# Patient Record
Sex: FEMALE | Race: BLACK OR AFRICAN AMERICAN | Employment: PART TIME | URBAN - METROPOLITAN AREA
[De-identification: names, ages, dates, MRNs, and addresses within clinical notes are randomized per-mention and may not be internally consistent; named-entity substitution may affect disease eponyms.]

---

## 2018-02-16 ENCOUNTER — HOSPITAL ENCOUNTER (OUTPATIENT)
Dept: BONE DENSITY | Age: 73
Discharge: HOME | End: 2018-02-16
Attending: FAMILY MEDICINE
Payer: MEDICARE

## 2018-02-16 ENCOUNTER — HOSPITAL ENCOUNTER (OUTPATIENT)
Dept: MAMMOGRAPHY | Age: 73
Discharge: HOME | End: 2018-02-16
Attending: FAMILY MEDICINE
Payer: MEDICARE

## 2018-02-16 DIAGNOSIS — M81.0 OSTEOPOROSIS: ICD-10-CM

## 2018-02-16 DIAGNOSIS — Z12.31 VISIT FOR SCREENING MAMMOGRAM: ICD-10-CM

## 2018-02-16 PROCEDURE — 77067 SCR MAMMO BI INCL CAD: CPT

## 2018-02-28 ENCOUNTER — HOSPITAL ENCOUNTER (OUTPATIENT)
Dept: BONE DENSITY | Age: 73
Discharge: HOME | End: 2018-02-28
Attending: FAMILY MEDICINE
Payer: MEDICARE

## 2018-02-28 PROCEDURE — 77080 DXA BONE DENSITY AXIAL: CPT

## 2019-02-13 ENCOUNTER — HOSPITAL ENCOUNTER (OUTPATIENT)
Dept: MAMMOGRAPHY | Age: 74
Discharge: HOME | End: 2019-02-13
Attending: FAMILY MEDICINE
Payer: MEDICARE

## 2019-02-13 DIAGNOSIS — Z12.31 VISIT FOR SCREENING MAMMOGRAM: ICD-10-CM

## 2019-02-13 PROCEDURE — 77067 SCR MAMMO BI INCL CAD: CPT

## 2020-03-03 ENCOUNTER — HOSPITAL ENCOUNTER (OUTPATIENT)
Dept: BONE DENSITY | Age: 75
Discharge: HOME | End: 2020-03-03
Attending: FAMILY MEDICINE
Payer: MEDICARE

## 2020-03-03 ENCOUNTER — HOSPITAL ENCOUNTER (OUTPATIENT)
Dept: MAMMOGRAPHY | Age: 75
Discharge: HOME | End: 2020-03-03
Attending: FAMILY MEDICINE
Payer: MEDICARE

## 2020-03-03 DIAGNOSIS — M81.0 OSTEOPOROSIS: ICD-10-CM

## 2020-03-03 DIAGNOSIS — Z12.31 SCREENING MAMMOGRAM FOR HIGH-RISK PATIENT: ICD-10-CM

## 2020-03-03 PROCEDURE — 77067 SCR MAMMO BI INCL CAD: CPT

## 2020-03-03 PROCEDURE — 77080 DXA BONE DENSITY AXIAL: CPT

## 2020-11-18 ENCOUNTER — TRANSCRIBE ORDER (OUTPATIENT)
Dept: SCHEDULING | Age: 75
End: 2020-11-18

## 2020-11-18 DIAGNOSIS — R13.10 DYSPHAGIA: Primary | ICD-10-CM

## 2020-12-04 ENCOUNTER — HOSPITAL ENCOUNTER (OUTPATIENT)
Dept: GENERAL RADIOLOGY | Age: 75
Discharge: HOME | End: 2020-12-04
Payer: MEDICARE

## 2020-12-04 DIAGNOSIS — R13.10 DYSPHAGIA: ICD-10-CM

## 2020-12-04 PROCEDURE — 92611 MOTION FLUOROSCOPY/SWALLOW: CPT

## 2020-12-04 PROCEDURE — 74230 X-RAY XM SWLNG FUNCJ C+: CPT

## 2020-12-04 NOTE — PROGRESS NOTES
[address]  [address] [facility], [place] MODIFIED BARIUM SWALLOW STUDY  Patient: Effie Bynum (14 y.o. female)  Date: 12/4/2020  Referring Provider:  Dr. Aminata Archer:   Patient reported globus sensation while pointing to her upper chest with food. This is worst with peanut butter which she no longer eats for this reason. Patient also reported choking with thin liquids while drinking flat in bed. Patient with history of esophageal dilation ~10 years ago. Patient reported she used to take medication for reflux, however per patient report she no longer takes it as it is not on the market anymore. OBJECTIVE:   Past Medical History:   Past Medical History:   Diagnosis Date    Arthritis     GERD (gastroesophageal reflux disease)     Hypertension     PUD (peptic ulcer disease)     Thyroid disease      Past Surgical History:   Procedure Laterality Date    HX OTHER SURGICAL      lens implant bilat.  HX TONSILLECTOMY      ND COLONOSCOPY FLX DX W/COLLJ SPEC WHEN PFRMD  1/11/2013          Current Dietary Status:  Regular/thin  Radiologist: Dr. Taylor Reevesmine: Lateral;Fluoro  Patient Position: standing upright    Trial 1:   Consistency Presented: Thin liquid;Puree; Solid   How Presented: Self-fed/presented;Cup/sip;Cup/gulp; Spoon;Straw;Successive swallows       Bolus Acceptance: No impairment   Bolus Formation/Control: No impairment:     Propulsion: No impairment   Oral Residue: None   Initiation of Swallow: No impairment   Timing: No impairment   Penetration: None   Aspiration/Timing: No evidence of aspiration   Pharyngeal Clearance: No residue                       Decreased Tongue Base Retraction?: No  Laryngeal Elevation: WFL (within functional limits)  Aspiration/Penetration Score: 1 (No penetration or aspiration-Contrast does not enter the airway)  Pharyngeal Symmetry: Not assessed  Pharyngeal-Esophageal Segment: Suspected esophageal dysphagia  Pharyngeal Dysfunction: None    Oral Phase Severity: No impairment  Pharyngeal Phase Severity: N/A     The NOMS functional outcome measure was used to quantify this patient's level of swallowing impairment. Based on the NOMS, the patient was determined to be at level 7 for swallow function         NOMS Swallowing Levels:  Level 1 (CN): NPO  Level 2 (CM): NPO but takes consistency in therapy  Level 3 (CL): Takes less than 50% of nutrition p.o. and continues with nonoral feedings; and/or safe with mod cues; and/or max diet restriction  Level 4 (CK): Safe swallow but needs mod cues; and/or mod diet restriction; and/or still requires some nonoral feeding/supplements  Level 5 (CJ): Safe swallow with min diet restriction; and/or needs min cues  Level 6 (CI): Independent with p.o.; rare cues; usually self cues; may need to avoid some foods or needs extra time  Level 7 ([address] [place] [address]): Independent for all p.o.  ARCELIA. (2003). National Outcomes Measurement System (NOMS): Adult Speech-Language Pathology User's Guide. ASSESSMENT :  Based on the objective data described above, the patient presents with no oral or pharyngeal dysphagia, and no penetration/aspiration or significant pharyngeal residue observed. Patient with reduced PES opening that did not obstruct bolus flow. Patient also with air in the esophagus at rest as well as cervical esophageal residue with puree and solid which suggests esophageal dysphagia. Of note, patient with throat clearing/coughing throughout in absence of penetration/aspiration.      PLAN/RECOMMENDATIONS :  -Continue diet as tolerated, encouraged soft solids  -Upright for all PO intake (and no drinking in bed), small/single bites, chew thoroughly, alternate bite/sip  -No further SLP treatment indicated  -Consider GI consult given suspected esophageal dysphagia     COMMUNICATION/EDUCATION:   The above findings and recommendations were discussed with: patient who verbalized understanding.     Thank you for this referral.  Shae Garcia, SLP  Time Calculation: 20 mins

## 2021-03-12 ENCOUNTER — TRANSCRIBE ORDER (OUTPATIENT)
Dept: SCHEDULING | Age: 76
End: 2021-03-12

## 2021-03-12 DIAGNOSIS — Z12.31 OTHER SCREENING MAMMOGRAM: Primary | ICD-10-CM

## 2021-03-23 ENCOUNTER — TRANSCRIBE ORDER (OUTPATIENT)
Dept: SCHEDULING | Age: 76
End: 2021-03-23

## 2021-03-23 DIAGNOSIS — M81.0 POSTMENOPAUSAL OSTEOPOROSIS: Primary | ICD-10-CM

## 2021-04-05 ENCOUNTER — HOSPITAL ENCOUNTER (OUTPATIENT)
Dept: MAMMOGRAPHY | Age: 76
Discharge: HOME | End: 2021-04-05
Payer: MEDICARE

## 2021-04-05 DIAGNOSIS — Z12.31 OTHER SCREENING MAMMOGRAM: ICD-10-CM

## 2021-04-05 PROCEDURE — 77067 SCR MAMMO BI INCL CAD: CPT

## 2022-03-21 ENCOUNTER — TRANSCRIBE ORDER (OUTPATIENT)
Dept: SCHEDULING | Age: 77
End: 2022-03-21

## 2022-03-21 DIAGNOSIS — Z12.31 VISIT FOR SCREENING MAMMOGRAM: Primary | ICD-10-CM

## 2022-04-01 ENCOUNTER — TRANSCRIBE ORDER (OUTPATIENT)
Dept: SCHEDULING | Age: 77
End: 2022-04-01

## 2022-04-01 DIAGNOSIS — M81.0 MENOPAUSAL OSTEOPOROSIS: Primary | ICD-10-CM

## 2022-04-01 DIAGNOSIS — Z12.31 VISIT FOR SCREENING MAMMOGRAM: Primary | ICD-10-CM

## 2022-04-06 ENCOUNTER — HOSPITAL ENCOUNTER (OUTPATIENT)
Dept: MAMMOGRAPHY | Age: 77
Discharge: HOME | End: 2022-04-06
Attending: FAMILY MEDICINE
Payer: MEDICARE

## 2022-04-06 DIAGNOSIS — Z12.31 VISIT FOR SCREENING MAMMOGRAM: ICD-10-CM

## 2022-04-06 PROCEDURE — 77067 SCR MAMMO BI INCL CAD: CPT

## 2022-04-07 ENCOUNTER — HOSPITAL ENCOUNTER (OUTPATIENT)
Dept: BONE DENSITY | Age: 77
Discharge: HOME | End: 2022-04-07
Attending: FAMILY MEDICINE
Payer: MEDICARE

## 2022-04-07 DIAGNOSIS — M81.0 MENOPAUSAL OSTEOPOROSIS: ICD-10-CM

## 2022-04-07 PROCEDURE — 77080 DXA BONE DENSITY AXIAL: CPT

## 2022-09-14 RX ORDER — HEPARIN 100 UNIT/ML
500 SYRINGE INTRAVENOUS AS NEEDED
Start: 2022-09-23

## 2022-09-14 RX ORDER — SODIUM CHLORIDE 0.9 % (FLUSH) 0.9 %
5-40 SYRINGE (ML) INJECTION AS NEEDED
OUTPATIENT
Start: 2022-09-23

## 2022-09-14 RX ORDER — SODIUM CHLORIDE 9 MG/ML
5-250 INJECTION, SOLUTION INTRAVENOUS AS NEEDED
OUTPATIENT
Start: 2022-09-23

## 2022-09-14 RX ORDER — SODIUM CHLORIDE 9 MG/ML
5-40 INJECTION INTRAMUSCULAR; INTRAVENOUS; SUBCUTANEOUS AS NEEDED
OUTPATIENT
Start: 2022-09-23

## 2022-09-20 NOTE — PROGRESS NOTES
Cherelle Eid is a 68 y.o. female here for new patient appt for thrombocytopenia. Labs done 7/22/22  Plt 47  Pt was seeing a hematologist about 2 years ago but he moved to [place]. She was taking 8 fish oil tablets per day for him but was not helping. She has pigmentation of the skin. She does bruise easily. No unusual bleeding. She has changed her diet and has lost some weight recently. 1. Have you been to the ER, urgent care clinic since your last visit? Hospitalized since your last visit? New Pt    2. Have you seen or consulted any other health care providers outside of the [address] [place] [address] since your last visit? Include any pap smears or colon screening.  New Pt

## 2022-09-23 ENCOUNTER — OFFICE VISIT (OUTPATIENT)
Dept: ONCOLOGY | Age: 77
End: 2022-09-23
Payer: MEDICARE

## 2022-09-23 ENCOUNTER — HOSPITAL ENCOUNTER (OUTPATIENT)
Dept: INFUSION THERAPY | Age: 77
Discharge: HOME | End: 2022-09-23
Payer: MEDICARE

## 2022-09-23 VITALS
HEART RATE: 72 BPM | RESPIRATION RATE: 16 BRPM | TEMPERATURE: 97.1 F | SYSTOLIC BLOOD PRESSURE: 134 MMHG | DIASTOLIC BLOOD PRESSURE: 76 MMHG | OXYGEN SATURATION: 95 %

## 2022-09-23 VITALS
HEART RATE: 72 BPM | RESPIRATION RATE: 16 BRPM | HEIGHT: 62 IN | DIASTOLIC BLOOD PRESSURE: 76 MMHG | BODY MASS INDEX: 32.35 KG/M2 | OXYGEN SATURATION: 95 % | TEMPERATURE: 97.1 F | WEIGHT: 175.8 LBS | SYSTOLIC BLOOD PRESSURE: 134 MMHG

## 2022-09-23 DIAGNOSIS — D69.3 CHRONIC ITP (IDIOPATHIC THROMBOCYTOPENIA) (HCC): Primary | ICD-10-CM

## 2022-09-23 LAB
BASOPHILS # BLD: 0 K/UL (ref 0–0.1)
BASOPHILS NFR BLD: 1 % (ref 0–1)
DIFFERENTIAL METHOD BLD: ABNORMAL
EOSINOPHIL # BLD: 0.1 K/UL (ref 0–0.4)
EOSINOPHIL NFR BLD: 1 % (ref 0–7)
ERYTHROCYTE [DISTWIDTH] IN BLOOD BY AUTOMATED COUNT: 13.5 % (ref 11.5–14.5)
HCT VFR BLD AUTO: 44 % (ref 35–47)
HGB BLD-MCNC: 14.2 G/DL (ref 11.5–16)
IMM GRANULOCYTES # BLD AUTO: 0 K/UL (ref 0–0.04)
IMM GRANULOCYTES NFR BLD AUTO: 0 % (ref 0–0.5)
LYMPHOCYTES # BLD: 1.3 K/UL (ref 0.8–3.5)
LYMPHOCYTES NFR BLD: 23 % (ref 12–49)
MCH RBC QN AUTO: 29.1 PG (ref 26–34)
MCHC RBC AUTO-ENTMCNC: 32.3 G/DL (ref 30–36.5)
MCV RBC AUTO: 90.2 FL (ref 80–99)
MONOCYTES # BLD: 0.5 K/UL (ref 0–1)
MONOCYTES NFR BLD: 9 % (ref 5–13)
NEUTS SEG # BLD: 3.6 K/UL (ref 1.8–8)
NEUTS SEG NFR BLD: 66 % (ref 32–75)
NRBC # BLD: 0 K/UL (ref 0–0.01)
NRBC BLD-RTO: 0 PER 100 WBC
PLATELET # BLD AUTO: 52 K/UL (ref 150–400)
PMV BLD AUTO: ABNORMAL FL (ref 8.9–12.9)
RBC # BLD AUTO: 4.88 M/UL (ref 3.8–5.2)
WBC # BLD AUTO: 5.4 K/UL (ref 3.6–11)

## 2022-09-23 PROCEDURE — 1101F PT FALLS ASSESS-DOCD LE1/YR: CPT | Performed by: INTERNAL MEDICINE

## 2022-09-23 PROCEDURE — G8419 CALC BMI OUT NRM PARAM NOF/U: HCPCS | Performed by: INTERNAL MEDICINE

## 2022-09-23 PROCEDURE — 1123F ACP DISCUSS/DSCN MKR DOCD: CPT | Performed by: INTERNAL MEDICINE

## 2022-09-23 PROCEDURE — G8427 DOCREV CUR MEDS BY ELIG CLIN: HCPCS | Performed by: INTERNAL MEDICINE

## 2022-09-23 PROCEDURE — G8399 PT W/DXA RESULTS DOCUMENT: HCPCS | Performed by: INTERNAL MEDICINE

## 2022-09-23 PROCEDURE — G8536 NO DOC ELDER MAL SCRN: HCPCS | Performed by: INTERNAL MEDICINE

## 2022-09-23 PROCEDURE — G8754 DIAS BP LESS 90: HCPCS | Performed by: INTERNAL MEDICINE

## 2022-09-23 PROCEDURE — 1090F PRES/ABSN URINE INCON ASSESS: CPT | Performed by: INTERNAL MEDICINE

## 2022-09-23 PROCEDURE — G8752 SYS BP LESS 140: HCPCS | Performed by: INTERNAL MEDICINE

## 2022-09-23 PROCEDURE — G8432 DEP SCR NOT DOC, RNG: HCPCS | Performed by: INTERNAL MEDICINE

## 2022-09-23 PROCEDURE — 36415 COLL VENOUS BLD VENIPUNCTURE: CPT

## 2022-09-23 PROCEDURE — 99204 OFFICE O/P NEW MOD 45 MIN: CPT | Performed by: INTERNAL MEDICINE

## 2022-09-23 PROCEDURE — 85025 COMPLETE CBC W/AUTO DIFF WBC: CPT

## 2022-09-23 RX ORDER — ROSUVASTATIN CALCIUM 5 MG/1
TABLET, COATED ORAL
COMMUNITY
Start: 2022-08-16

## 2022-09-23 NOTE — PROGRESS NOTES
[facility] Lab Draw Note:  Arrived - 1500    Visit Vitals  /76 (BP 1 Location: Left upper arm, BP Patient Position: Sitting)   Pulse 72   Temp 97.1 °F (36.2 °C)   Resp 16   SpO2 95%       Labs drawn peripherally from left ac  arm -   1504 - Tolerated well. Pt denies any acute problems/changes. Discharged from [address] [place] [address] ambulatory. No distress. Next appt:  N/A      See connect care for pending lab results.

## 2022-09-23 NOTE — LETTER
9/23/2022    Patient: Lauren Clemens   YOB: 1945   Date of Visit: 9/23/2022     Becca Dickens MD  [facility] [address] [address]  Via Fax: [phone]    Dear Becca Dickens MD,      Thank you for referring Ms. Lyle Alcazar to [address] [place] [address] for evaluation. My notes for this consultation are attached. If you have questions, please do not hesitate to call me. I look forward to following your patient along with you.       Sincerely,    Radha Becerra MD

## 2022-09-23 NOTE — PROGRESS NOTES
[address] [facility] [address], [address] [facility], [address] [place] [address]  [phone]      Hematology Note        Patient: Andrew De La Torre MRN: [id number]  SSN: [id number]    YOB: 1945  Age: 68 y.o. Sex: female        Subjective: Andrew De La Torre is a 68 y.o. female who I am seeing for thrombocytopenia. She has suffered with moderate thrombocytopenia for over 2 yrs. She denies bleeding or excessive bleeding. This was noted in routine laboratory studies. She has yet to receive treatment for it      Review of Systems:    Constitutional: negative  Eyes: negative  Ears, Nose, Mouth, Throat, and Face: negative  Respiratory: negative  Cardiovascular: negative  Gastrointestinal: negative  Genitourinary:negative  Integument/Breast: negative  Hematologic/Lymphatic: negative  Musculoskeletal:negative  Neurological: negative      Past Medical History:   Diagnosis Date    Arthritis     GERD (gastroesophageal reflux disease)     Hypertension     PUD (peptic ulcer disease)     Thyroid disease      Past Surgical History:   Procedure Laterality Date    HX OTHER SURGICAL      lens implant bilat. HX TONSILLECTOMY      MT COLONOSCOPY FLX DX W/COLLJ SPEC WHEN PFRMD  1/11/2013           Family History   Problem Relation Age of Onset    Diabetes Sister     Cancer Sister         leg     Social History     Tobacco Use    Smoking status: Never    Smokeless tobacco: Never   Substance Use Topics    Alcohol use: No      Prior to Admission medications    Medication Sig Start Date End Date Taking? Authorizing Provider   rosuvastatin (CRESTOR) 5 mg tablet  8/16/22  Yes Provider, Historical   ergocalciferol, vitamin D2, (VITAMIN D2 PO) Take  by mouth. 2000 iu per day   Yes Provider, Historical   acetaminophen (TYLENOL PO) Take  by mouth. Yes Provider, Historical   ascorbic acid (ARSEN-C PO) Take  by mouth.  1000 mg   Yes Provider, Historical   omega-3 fatty acids/fish oil (FISH OIL OMEGA 3-6-9 PO) Take  by mouth. 1000 mg per day   Yes Provider, Historical   potassium chloride SA (MICRO-K) 10 mEq capsule Take 10 mEq by mouth two (2) times a day. Yes Provider, Historical   levothyroxine (SYNTHROID) 125 mcg tablet Take 125 mcg by mouth Daily (before breakfast). Yes Provider, Historical   omega-3 fatty acids-vitamin e 1,000 mg cap Take 1 Cap by mouth daily. Yes Provider, Historical   hydrochlorothiazide (HYDRODIURIL) 25 mg tablet Take 25 mg by mouth daily. Indications: HYPERTENSION   Yes Provider, Historical   famotidine (PEPCID) 20 mg tablet Take 1 Tab by mouth two (2) times a day. Patient not taking: Reported on 9/23/2022 5/12/15   Urbano Stauffer MD   fluconazole (DIFLUCAN) 150 mg tablet TAKE 1 TABLET BY MOUTH DAILY FOR 1 DAY  Patient not taking: Reported on 9/23/2022 6/11/14   All Tran MD   alendronate 70 mg tbef Take 1 Tab by mouth every seven (7) days. Patient not taking: Reported on 9/23/2022    Provider, Historical   nystatin-triamcinolone (MYCOLOG II) topical cream Apply  to affected area two (2) times a day. Patient not taking: Reported on 9/23/2022 4/15/14   All Tran MD   estradiol (ESTRACE) 1 mg tablet TAKE ONE-HALF TABLET BY MOUTH DAILY  Patient not taking: Reported on 9/23/2022 11/11/13   Marely Bush MD   calcium-cholecalciferol, d3, 349-162 mg-unit tab Take 1 Tab by mouth daily as needed. Patient not taking: Reported on 9/23/2022    Provider, Historical   pantoprazole (PROTONIX) 40 mg tablet Take 40 mg by mouth daily. Patient not taking: Reported on 9/23/2022    Provider, Historical   multivitamin, stress formula (STRESS TAB) tablet Take 1 Tab by mouth daily.   Patient not taking: Reported on 9/23/2022    Provider, Historical              No Known Allergies        Objective:     Vitals:    09/23/22 1542   BP: 134/76   Pulse: 72   Resp: 16   Temp: 97.1 °F (36.2 °C)   SpO2: 95%   Weight: 175 lb 12.8 oz (79.7 kg)   Height: 5' 2\" (1.575 m)            Physical Exam:  GENERAL: alert, cooperative, no distress, appears stated age  EYE: conjunctivae/corneas clear. PERRL, EOM's intact  LYMPHATIC: Cervical, supraclavicular, and axillary nodes normal.   THROAT & NECK: normal and no erythema or exudates noted. LUNG: clear to auscultation bilaterally  HEART: regular rate and rhythm, S1, S2 normal, no murmur, click, rub or gallop  ABDOMEN: soft, non-tender. Bowel sounds normal. No masses,  no organomegaly  EXTREMITIES:  extremities normal, atraumatic, no cyanosis or edema  SKIN: no rash or abnormalities  NEUROLOGIC: AOx3. Gait normal. Reflexes and motor strength normal and symmetric. Cranial nerves 2-12 and sensation grossly intact. LABS:     Lab Results   Component Value Date/Time    WBC 5.4 09/23/2022 03:05 PM    HGB 14.2 09/23/2022 03:05 PM    HCT 44.0 09/23/2022 03:05 PM    PLATELET 52 (L) 94/00/7862 03:05 PM    MCV 90.2 09/23/2022 03:05 PM            Assessment:     1. Chronic immune thrombocytopenic purpura    Treatment naive  Plt 52 today  Recommend observation      Plan:       1. Observation  2. Return in 6 months  3. CBC with diff every 3 months      Signed by: Dion Simon MD                     September 23, 2022      CC.  Chuck Sargent MD

## 2023-03-21 ENCOUNTER — TELEPHONE (OUTPATIENT)
Dept: ONCOLOGY | Age: 78
End: 2023-03-21

## 2023-03-21 NOTE — TELEPHONE ENCOUNTER
Left a VM asking for a call back. Need to know if pt got labs drawn recently and if so please obtain from PCP before her appt this Friday.

## 2023-03-22 ENCOUNTER — DOCUMENTATION ONLY (OUTPATIENT)
Dept: ONCOLOGY | Age: 78
End: 2023-03-22

## 2023-03-28 ENCOUNTER — TRANSCRIBE ORDER (OUTPATIENT)
Dept: SCHEDULING | Age: 78
End: 2023-03-28

## 2023-03-28 DIAGNOSIS — Z12.31 SCREENING MAMMOGRAM FOR HIGH-RISK PATIENT: Primary | ICD-10-CM

## 2023-04-22 ENCOUNTER — TRANSCRIBE ORDERS (OUTPATIENT)
Facility: HOSPITAL | Age: 78
End: 2023-04-22

## 2023-04-22 DIAGNOSIS — Z12.31 SCREENING MAMMOGRAM FOR HIGH-RISK PATIENT: Primary | ICD-10-CM

## 2023-04-23 DIAGNOSIS — Z12.31 SCREENING MAMMOGRAM FOR HIGH-RISK PATIENT: Primary | ICD-10-CM

## 2024-01-02 ENCOUNTER — TELEPHONE (OUTPATIENT)
Age: 79
End: 2024-01-02

## 2024-02-24 ENCOUNTER — HOSPITAL ENCOUNTER (OUTPATIENT)
Facility: HOSPITAL | Age: 79
End: 2024-02-24
Payer: MEDICARE

## 2024-02-24 DIAGNOSIS — F17.211 CIGARETTE NICOTINE DEPENDENCE IN REMISSION: ICD-10-CM

## 2024-02-24 DIAGNOSIS — J43.9 PULMONARY EMPHYSEMA, UNSPECIFIED EMPHYSEMA TYPE (HCC): ICD-10-CM

## 2024-02-24 PROCEDURE — 71271 CT THORAX LUNG CANCER SCR C-: CPT

## 2024-03-05 ENCOUNTER — TELEPHONE (OUTPATIENT)
Age: 79
End: 2024-03-05

## 2024-03-05 NOTE — TELEPHONE ENCOUNTER
Per  we should see pt for follow up in an nonurgent fashion to follow up on her platelet count.  We will need to bring her into the [facility] and then office visit to follow same day.    Called pt.  No answer.  Left VM asking for a call back.

## 2024-04-04 NOTE — PROGRESS NOTES
Henny Darden is a 77 y.o. female here for follow up appt for thrombocytopenia.  Pt states she is fatigued all the time. She goes to bed at 8:00 and does sleep all night.     1. Have you been to the ER, urgent care clinic since your last visit?  Hospitalized since your last visit? Re-establish    2. Have you seen or consulted any other health care providers outside of the [facility] since your last visit?  Include any pap smears or colon screening. Re-establish

## 2024-04-10 ENCOUNTER — OFFICE VISIT (OUTPATIENT)
Age: 79
End: 2024-04-10
Payer: MEDICARE

## 2024-04-10 ENCOUNTER — HOSPITAL ENCOUNTER (OUTPATIENT)
Facility: HOSPITAL | Age: 79
Setting detail: INFUSION SERIES
Discharge: HOME | End: 2024-04-10
Payer: MEDICARE

## 2024-04-10 VITALS
HEIGHT: 62 IN | BODY MASS INDEX: 31.65 KG/M2 | HEART RATE: 60 BPM | WEIGHT: 172 LBS | TEMPERATURE: 98 F | OXYGEN SATURATION: 96 % | DIASTOLIC BLOOD PRESSURE: 64 MMHG | SYSTOLIC BLOOD PRESSURE: 131 MMHG

## 2024-04-10 DIAGNOSIS — D69.3 ACUTE IDIOPATHIC THROMBOCYTOPENIC PURPURA (HCC): Primary | ICD-10-CM

## 2024-04-10 DIAGNOSIS — D69.3 IMMUNE THROMBOCYTOPENIC PURPURA (HCC): ICD-10-CM

## 2024-04-10 DIAGNOSIS — D69.6 SEVERE THROMBOCYTOPENIA (HCC): Primary | ICD-10-CM

## 2024-04-10 LAB
BASOPHILS # BLD: 0.1 K/UL (ref 0–0.1)
BASOPHILS NFR BLD: 1 % (ref 0–1)
DIFFERENTIAL METHOD BLD: ABNORMAL
EOSINOPHIL # BLD: 0.2 K/UL (ref 0–0.4)
EOSINOPHIL NFR BLD: 3 % (ref 0–7)
ERYTHROCYTE [DISTWIDTH] IN BLOOD BY AUTOMATED COUNT: 13.1 % (ref 11.5–14.5)
HCT VFR BLD AUTO: 43.2 % (ref 35–47)
HGB BLD-MCNC: 13.7 G/DL (ref 11.5–16)
IMM GRANULOCYTES # BLD AUTO: 0 K/UL (ref 0–0.04)
IMM GRANULOCYTES NFR BLD AUTO: 0 % (ref 0–0.5)
LYMPHOCYTES # BLD: 1.2 K/UL (ref 0.8–3.5)
LYMPHOCYTES NFR BLD: 22 % (ref 12–49)
MCH RBC QN AUTO: 29 PG (ref 26–34)
MCHC RBC AUTO-ENTMCNC: 31.7 G/DL (ref 30–36.5)
MCV RBC AUTO: 91.5 FL (ref 80–99)
MONOCYTES # BLD: 0.5 K/UL (ref 0–1)
MONOCYTES NFR BLD: 9 % (ref 5–13)
NEUTS SEG # BLD: 3.3 K/UL (ref 1.8–8)
NEUTS SEG NFR BLD: 65 % (ref 32–75)
NRBC # BLD: 0 K/UL (ref 0–0.01)
NRBC BLD-RTO: 0 PER 100 WBC
PLATELET # BLD AUTO: 28 K/UL (ref 150–400)
PLATELET COMMENT: ABNORMAL
RBC # BLD AUTO: 4.72 M/UL (ref 3.8–5.2)
RBC MORPH BLD: ABNORMAL
WBC # BLD AUTO: 5.3 K/UL (ref 3.6–11)

## 2024-04-10 PROCEDURE — 85025 COMPLETE CBC W/AUTO DIFF WBC: CPT

## 2024-04-10 PROCEDURE — 36415 COLL VENOUS BLD VENIPUNCTURE: CPT

## 2024-04-10 PROCEDURE — 99214 OFFICE O/P EST MOD 30 MIN: CPT | Performed by: INTERNAL MEDICINE

## 2024-04-10 RX ORDER — CHLORAL HYDRATE 500 MG
CAPSULE ORAL DAILY
COMMUNITY

## 2024-04-10 RX ORDER — FLUTICASONE PROPIONATE 50 MCG
1 SPRAY, SUSPENSION (ML) NASAL DAILY
COMMUNITY

## 2024-04-10 RX ORDER — ACETAMINOPHEN 500 MG
500 TABLET ORAL EVERY 6 HOURS PRN
COMMUNITY

## 2024-04-10 RX ORDER — ALBUTEROL SULFATE 90 UG/1
AEROSOL, METERED RESPIRATORY (INHALATION)
COMMUNITY
Start: 2024-04-02

## 2024-04-10 RX ORDER — FLUTICASONE PROPIONATE AND SALMETEROL 100; 50 UG/1; UG/1
POWDER RESPIRATORY (INHALATION)
COMMUNITY
Start: 2024-01-02

## 2024-04-10 NOTE — PROGRESS NOTES
[facility] [place]  at [facility]  [address] [facility], [facility] [address], [address]  [place] [id number] [phone]         Hematology Oncology Note        Patient: Henny Darden MRN: [id number]  SSN: [id number]    YOB: 1945  Age: 79 y.o.  Sex: female        Subjective:      Henny Darden is a 79 y.o. female who I am seeing for severe thrombocytopenia. She has occasional nose bleeds. She is excessively fatigued. Nail colors have changed. I last saw her in 2022 for the same problem but she failed to come back for a repeat visit,       Review of Systems:    Constitutional: negative  Eyes: negative  Ears, Nose, Mouth, Throat, and Face: negative  Respiratory: negative  Cardiovascular: negative  Gastrointestinal: negative  Genitourinary:negative  Integument/Breast: negative  Hematologic/Lymphatic: negative  Musculoskeletal:negative  Neurological: negative      Past Medical History:   Diagnosis Date    Arthritis     GERD (gastroesophageal reflux disease)     Hypertension     PUD (peptic ulcer disease)     Thyroid disease      Past Surgical History:   Procedure Laterality Date    COLONOSCOPY FLX DX W/COLLJ SPEC WHEN PFRMD  1/11/2013         OTHER SURGICAL HISTORY      lens implant bilat.    TONSILLECTOMY        Family History   Problem Relation Age of Onset    Diabetes Sister     Cancer Sister         leg     Social History     Tobacco Use    Smoking status: Never    Smokeless tobacco: Never   Substance Use Topics    Alcohol use: No      Prior to Admission medications    Medication Sig Start Date End Date Taking? Authorizing Provider   Omega-3 Fatty Acids (FISH OIL) 1000 MG capsule Take by mouth daily   Yes Anupama Davis MD   acetaminophen (TYLENOL) 500 MG tablet Take 1 tablet by mouth every 6 hours as needed for Pain   Yes Anupama Davis MD   Multiple Vitamin (MULTI VITAMIN DAILY PO) Take by mouth   Yes Anupama Davis MD   fluticasone

## 2024-04-10 NOTE — PROGRESS NOTES
VORB FROM DR TURNER CT GUIDED BONE MARROW BX AND ASPIRATION    ALSO NEEDS CBC IN CITRATED TUBE NEXT WEEK.

## 2024-04-29 ENCOUNTER — HOSPITAL ENCOUNTER (OUTPATIENT)
Facility: HOSPITAL | Age: 79
Discharge: HOME | End: 2024-05-02
Attending: INTERNAL MEDICINE
Payer: MEDICARE

## 2024-04-29 VITALS
OXYGEN SATURATION: 96 % | TEMPERATURE: 97.6 F | HEIGHT: 62 IN | BODY MASS INDEX: 31.65 KG/M2 | WEIGHT: 172 LBS | RESPIRATION RATE: 16 BRPM | DIASTOLIC BLOOD PRESSURE: 82 MMHG | SYSTOLIC BLOOD PRESSURE: 143 MMHG | HEART RATE: 70 BPM

## 2024-04-29 DIAGNOSIS — D69.3 IMMUNE THROMBOCYTOPENIC PURPURA (HCC): ICD-10-CM

## 2024-04-29 LAB
BASOPHILS # BLD: 0.1 K/UL (ref 0–0.1)
BASOPHILS NFR BLD: 1 % (ref 0–1)
DIFFERENTIAL METHOD BLD: ABNORMAL
EOSINOPHIL # BLD: 0.1 K/UL (ref 0–0.4)
EOSINOPHIL NFR BLD: 2 % (ref 0–7)
ERYTHROCYTE [DISTWIDTH] IN BLOOD BY AUTOMATED COUNT: 13.2 % (ref 11.5–14.5)
HCT VFR BLD AUTO: 43.5 % (ref 35–47)
HGB BLD-MCNC: 13.8 G/DL (ref 11.5–16)
IMM GRANULOCYTES # BLD AUTO: 0 K/UL (ref 0–0.04)
IMM GRANULOCYTES NFR BLD AUTO: 0 % (ref 0–0.5)
LYMPHOCYTES # BLD: 1.2 K/UL (ref 0.8–3.5)
LYMPHOCYTES NFR BLD: 23 % (ref 12–49)
MCH RBC QN AUTO: 28.8 PG (ref 26–34)
MCHC RBC AUTO-ENTMCNC: 31.7 G/DL (ref 30–36.5)
MCV RBC AUTO: 90.6 FL (ref 80–99)
MONOCYTES # BLD: 0.5 K/UL (ref 0–1)
MONOCYTES NFR BLD: 9 % (ref 5–13)
NEUTS SEG # BLD: 3.4 K/UL (ref 1.8–8)
NEUTS SEG NFR BLD: 65 % (ref 32–75)
NRBC # BLD: 0 K/UL (ref 0–0.01)
NRBC BLD-RTO: 0 PER 100 WBC
PLATELET # BLD AUTO: 54 K/UL (ref 150–400)
PLATELET COMMENT: ABNORMAL
RBC # BLD AUTO: 4.8 M/UL (ref 3.8–5.2)
RBC MORPH BLD: ABNORMAL
WBC # BLD AUTO: 5.3 K/UL (ref 3.6–11)

## 2024-04-29 PROCEDURE — 88313 SPECIAL STAINS GROUP 2: CPT

## 2024-04-29 PROCEDURE — 6360000002 HC RX W HCPCS: Performed by: STUDENT IN AN ORGANIZED HEALTH CARE EDUCATION/TRAINING PROGRAM

## 2024-04-29 PROCEDURE — 88342 IMHCHEM/IMCYTCHM 1ST ANTB: CPT

## 2024-04-29 PROCEDURE — 38222 DX BONE MARROW BX & ASPIR: CPT

## 2024-04-29 PROCEDURE — 88305 TISSUE EXAM BY PATHOLOGIST: CPT

## 2024-04-29 PROCEDURE — 88341 IMHCHEM/IMCYTCHM EA ADD ANTB: CPT

## 2024-04-29 PROCEDURE — 36415 COLL VENOUS BLD VENIPUNCTURE: CPT

## 2024-04-29 PROCEDURE — 85025 COMPLETE CBC W/AUTO DIFF WBC: CPT

## 2024-04-29 PROCEDURE — 88311 DECALCIFY TISSUE: CPT

## 2024-04-29 RX ORDER — MIDAZOLAM HYDROCHLORIDE 5 MG/5ML
5 INJECTION, SOLUTION INTRAMUSCULAR; INTRAVENOUS
Status: DISCONTINUED | OUTPATIENT
Start: 2024-04-29 | End: 2024-04-29

## 2024-04-29 RX ORDER — FENTANYL CITRATE 50 UG/ML
100 INJECTION, SOLUTION INTRAMUSCULAR; INTRAVENOUS
Status: DISCONTINUED | OUTPATIENT
Start: 2024-04-29 | End: 2024-04-29

## 2024-04-29 RX ADMIN — MIDAZOLAM HYDROCHLORIDE 2 MG: 1 INJECTION, SOLUTION INTRAMUSCULAR; INTRAVENOUS at 12:25

## 2024-04-29 RX ADMIN — FENTANYL CITRATE 25 MCG: 50 INJECTION INTRAMUSCULAR; INTRAVENOUS at 12:30

## 2024-04-29 RX ADMIN — MIDAZOLAM HYDROCHLORIDE 1 MG: 1 INJECTION, SOLUTION INTRAMUSCULAR; INTRAVENOUS at 12:30

## 2024-04-29 RX ADMIN — FENTANYL CITRATE 50 MCG: 50 INJECTION INTRAMUSCULAR; INTRAVENOUS at 12:25

## 2024-04-29 ASSESSMENT — PAIN SCALES - GENERAL
PAINLEVEL_OUTOF10: 0

## 2024-04-29 NOTE — PROGRESS NOTES
1000: Patient arrived ambulatory to [facility] recovery area. Patient is A&Ox4 on RA in NAD at this time. Code status, allergies, and NPO status verified with patient prior to procedure start.     1040: CBC walked to lab.     Name of Procedure: Bone Marrow Biopsy     Sedation medications given:      Versed: 3 mg     Fentanyl:  75 mcg     Sedation Tolerated: well     Procedure and sedation times are the same.      Sedation Start: 1225  Sedation End: 1235     Vital Signs:  VSS throughout     Fluids Removed: n/a     Samples sent to lab: cytology present for biopsy     Any complications related to procedure: none identified at this time     Patient is A&Ox4, on RA, and is in NAD at this time.     This patient is at an increased risk of falling because they have received sedating medications. Please evaluate and implement fall precautions/fall prevention practices as appropriate.    1310: Discharge instructions reviewed with patient. Opportunity for questions and clarification given. Patient verbalized understanding. All lines and telemetry removed. Patient wheeled to meet son for discharge home.

## 2024-04-29 NOTE — DISCHARGE INSTRUCTIONS
[facility]  Radiology Department  [phone]    Radiologist:  [facility]-Marcus Ennis NP    Date: 04/29/2024       Bone Marrow Biopsy Discharge Instructions      Go home and rest  and restrict your activity the next 24 hours.     You have been given sedating medications, so do not drive or make important decisions today.      Resume your previous diet and prescribed medications.    You may shower in 24 hours.  Do not soak or swim until the biopsy site has healed completely to minimize any risk of infection.  Watch site for redness, drainage, pus, foul odor, increasing pain and fevers.  Should this occur call you doctor immediatly.     You may take Tylenol if allowed, as directed on the label, for pain or discomfort.  Avoid Ibuprofen (Advil, Motrin etc.) and Aspirin today as they may increase your risk of bleeding.       Be sure to follow up with your physician, and let him know how you are progressing and to receive your results.       If you have any questions about your procedure today please call and ask to speak to a radiology nurse.       I

## 2024-04-29 NOTE — H&P
INTERVENTIONAL RADIOLOGY  Preoperative History and Physical      Patient:  Henny Darden  :  1945  Age:  79 y.o.  MRN:  [id number]  Today's Date:  2024      CC / HPI   Henny Darden is a 79 y.o. female with a history of thrombocytopenia who presents for bone marrow biopsy.    PAST MEDICAL HISTORY  Past Medical History:   Diagnosis Date    Arthritis     GERD (gastroesophageal reflux disease)     Hypertension     PUD (peptic ulcer disease)     Thyroid disease      PAST SURGICAL HISTORY  Past Surgical History:   Procedure Laterality Date    COLONOSCOPY FLX DX W/COLLJ SPEC WHEN PFRMD  2013         OTHER SURGICAL HISTORY      lens implant bilat.    TONSILLECTOMY       SOCIAL HISTORY  Social History     Socioeconomic History    Marital status: Single     Spouse name: Not on file    Number of children: Not on file    Years of education: Not on file    Highest education level: Not on file   Occupational History    Not on file   Tobacco Use    Smoking status: Never    Smokeless tobacco: Never   Substance and Sexual Activity    Alcohol use: No    Drug use: No    Sexual activity: Not on file   Other Topics Concern    Not on file   Social History Narrative    Not on file     Social Determinants of Health     Financial Resource Strain: Not on file   Food Insecurity: Not on file   Transportation Needs: Not on file   Physical Activity: Not on file   Stress: Not on file   Social Connections: Not on file   Intimate Partner Violence: Not on file   Housing Stability: Not on file     FAMILY HISTORY  Family History   Problem Relation Age of Onset    Diabetes Sister     Cancer Sister         leg     CURRENT MEDICATIONS  Current Outpatient Medications   Medication Sig Dispense Refill    Omega-3 Fatty Acids (FISH OIL) 1000 MG capsule Take by mouth daily      acetaminophen (TYLENOL) 500 MG tablet Take 1 tablet by mouth every 6 hours as needed for Pain      Multiple Vitamin (MULTI VITAMIN DAILY PO) Take by mouth

## 2024-05-08 ENCOUNTER — OFFICE VISIT (OUTPATIENT)
Age: 79
End: 2024-05-08
Payer: MEDICARE

## 2024-05-08 VITALS
DIASTOLIC BLOOD PRESSURE: 70 MMHG | TEMPERATURE: 98 F | SYSTOLIC BLOOD PRESSURE: 119 MMHG | OXYGEN SATURATION: 97 % | WEIGHT: 172.8 LBS | HEART RATE: 94 BPM | HEIGHT: 62 IN | BODY MASS INDEX: 31.8 KG/M2

## 2024-05-08 DIAGNOSIS — D69.3 IMMUNE THROMBOCYTOPENIC PURPURA (HCC): Primary | ICD-10-CM

## 2024-05-08 PROCEDURE — 3074F SYST BP LT 130 MM HG: CPT | Performed by: INTERNAL MEDICINE

## 2024-05-08 PROCEDURE — 99213 OFFICE O/P EST LOW 20 MIN: CPT | Performed by: INTERNAL MEDICINE

## 2024-05-08 PROCEDURE — 1090F PRES/ABSN URINE INCON ASSESS: CPT | Performed by: INTERNAL MEDICINE

## 2024-05-08 PROCEDURE — G8399 PT W/DXA RESULTS DOCUMENT: HCPCS | Performed by: INTERNAL MEDICINE

## 2024-05-08 PROCEDURE — 1123F ACP DISCUSS/DSCN MKR DOCD: CPT | Performed by: INTERNAL MEDICINE

## 2024-05-08 PROCEDURE — G8417 CALC BMI ABV UP PARAM F/U: HCPCS | Performed by: INTERNAL MEDICINE

## 2024-05-08 PROCEDURE — 1036F TOBACCO NON-USER: CPT | Performed by: INTERNAL MEDICINE

## 2024-05-08 PROCEDURE — G8427 DOCREV CUR MEDS BY ELIG CLIN: HCPCS | Performed by: INTERNAL MEDICINE

## 2024-05-08 PROCEDURE — 3078F DIAST BP <80 MM HG: CPT | Performed by: INTERNAL MEDICINE

## 2024-05-08 NOTE — PROGRESS NOTES
[facility] [place]  at [facility]  [address], [facility] [address], [address]  [place] [id number] [phone]         Hematology Oncology Note        Patient: Henny Darden MRN: [id number]  SSN: [id number]    YOB: 1945  Age: 79 y.o.  Sex: female        Subjective:      Henny Darden is a 79 y.o. female who I am seeing for severe thrombocytopenia. She has occasional nose bleeds. She is excessively fatigued. Nail colors have changed. Bone marrow is normal. She comes in to discuss the next steps.       Review of Systems:    Constitutional: negative  Eyes: negative  Ears, Nose, Mouth, Throat, and Face: negative  Respiratory: negative  Cardiovascular: negative  Gastrointestinal: negative  Genitourinary:negative  Integument/Breast: negative  Hematologic/Lymphatic: negative  Musculoskeletal:negative  Neurological: negative      Past Medical History:   Diagnosis Date    Arthritis     GERD (gastroesophageal reflux disease)     Hypertension     PUD (peptic ulcer disease)     Thyroid disease      Past Surgical History:   Procedure Laterality Date    COLONOSCOPY FLX DX W/COLLJ SPEC WHEN PFRMD  1/11/2013         OTHER SURGICAL HISTORY      lens implant bilat.    TONSILLECTOMY        Family History   Problem Relation Age of Onset    Diabetes Sister     Cancer Sister         leg     Social History     Tobacco Use    Smoking status: Never    Smokeless tobacco: Never   Substance Use Topics    Alcohol use: No      Prior to Admission medications    Medication Sig Start Date End Date Taking? Authorizing Provider   Omega-3 Fatty Acids (FISH OIL) 1000 MG capsule Take by mouth daily   Yes Anupama Davis MD   acetaminophen (TYLENOL) 500 MG tablet Take 1 tablet by mouth every 6 hours as needed for Pain   Yes Anupama Davis MD   Multiple Vitamin (MULTI VITAMIN DAILY PO) Take by mouth   Yes Anupama Davis MD   fluticasone (FLONASE) 50 MCG/ACT nasal spray 1

## 2024-05-24 ENCOUNTER — TRANSCRIBE ORDERS (OUTPATIENT)
Facility: HOSPITAL | Age: 79
End: 2024-05-24

## 2024-05-24 DIAGNOSIS — Z12.31 VISIT FOR SCREENING MAMMOGRAM: Primary | ICD-10-CM

## 2024-06-25 ENCOUNTER — HOSPITAL ENCOUNTER (OUTPATIENT)
Facility: HOSPITAL | Age: 79
Discharge: HOME | End: 2024-06-28
Payer: MEDICARE

## 2024-06-25 VITALS — WEIGHT: 172 LBS | HEIGHT: 62 IN | BODY MASS INDEX: 31.65 KG/M2

## 2024-06-25 DIAGNOSIS — Z12.31 VISIT FOR SCREENING MAMMOGRAM: ICD-10-CM

## 2024-06-25 PROCEDURE — 77067 SCR MAMMO BI INCL CAD: CPT

## 2024-06-26 ENCOUNTER — TELEPHONE (OUTPATIENT)
Facility: HOSPITAL | Age: 79
End: 2024-06-26

## 2024-06-26 NOTE — TELEPHONE ENCOUNTER
Scheduled Ms. Darden for a diagnostic right mammogram and possible ultrasound on 7/15/2024 at 10:30am (with a 30 min early arrival) at [facility].

## 2024-07-15 ENCOUNTER — HOSPITAL ENCOUNTER (OUTPATIENT)
Facility: HOSPITAL | Age: 79
Discharge: HOME | End: 2024-07-18
Payer: MEDICARE

## 2024-07-15 DIAGNOSIS — R92.8 ABNORMAL SCREENING MAMMOGRAM: ICD-10-CM

## 2024-07-15 PROCEDURE — G0279 TOMOSYNTHESIS, MAMMO: HCPCS

## 2024-07-15 PROCEDURE — 76642 ULTRASOUND BREAST LIMITED: CPT

## 2024-10-01 NOTE — PROGRESS NOTES
Henny Darden is a 77 y.o. female here for four month follow up appt for thrombocytopenia.  Labs in [facility] today.  She is fatigued all the time.     1. Have you been to the ER, urgent care clinic since your last visit?  Hospitalized since your last visit? no    2. Have you seen or consulted any other health care providers outside of the [facility] since your last visit?  Include any pap smears or colon screening.  no

## 2024-10-03 ENCOUNTER — HOSPITAL ENCOUNTER (OUTPATIENT)
Facility: HOSPITAL | Age: 79
Setting detail: INFUSION SERIES
Discharge: HOME | End: 2024-10-03
Payer: MEDICARE

## 2024-10-03 ENCOUNTER — OFFICE VISIT (OUTPATIENT)
Age: 79
End: 2024-10-03
Payer: MEDICARE

## 2024-10-03 VITALS
OXYGEN SATURATION: 98 % | WEIGHT: 169 LBS | TEMPERATURE: 97.5 F | HEART RATE: 67 BPM | BODY MASS INDEX: 31.1 KG/M2 | DIASTOLIC BLOOD PRESSURE: 71 MMHG | HEIGHT: 62 IN | SYSTOLIC BLOOD PRESSURE: 116 MMHG

## 2024-10-03 DIAGNOSIS — D69.3 IMMUNE THROMBOCYTOPENIC PURPURA (HCC): Primary | ICD-10-CM

## 2024-10-03 DIAGNOSIS — D69.3 ACUTE IDIOPATHIC THROMBOCYTOPENIC PURPURA (HCC): ICD-10-CM

## 2024-10-03 LAB
BASOPHILS # BLD: 0.1 K/UL (ref 0–0.1)
BASOPHILS NFR BLD: 1 % (ref 0–1)
DIFFERENTIAL METHOD BLD: ABNORMAL
EOSINOPHIL # BLD: 0.1 K/UL (ref 0–0.4)
EOSINOPHIL NFR BLD: 2 % (ref 0–7)
ERYTHROCYTE [DISTWIDTH] IN BLOOD BY AUTOMATED COUNT: 13.3 % (ref 11.5–14.5)
HCT VFR BLD AUTO: 42.5 % (ref 35–47)
HGB BLD-MCNC: 13.9 G/DL (ref 11.5–16)
IMM GRANULOCYTES # BLD AUTO: 0 K/UL (ref 0–0.04)
IMM GRANULOCYTES NFR BLD AUTO: 0 % (ref 0–0.5)
LYMPHOCYTES # BLD: 1.5 K/UL (ref 0.8–3.5)
LYMPHOCYTES NFR BLD: 28 % (ref 12–49)
MCH RBC QN AUTO: 29.4 PG (ref 26–34)
MCHC RBC AUTO-ENTMCNC: 32.7 G/DL (ref 30–36.5)
MCV RBC AUTO: 90 FL (ref 80–99)
MONOCYTES # BLD: 0.5 K/UL (ref 0–1)
MONOCYTES NFR BLD: 9 % (ref 5–13)
NEUTS SEG # BLD: 3 K/UL (ref 1.8–8)
NEUTS SEG NFR BLD: 60 % (ref 32–75)
NRBC # BLD: 0 K/UL (ref 0–0.01)
NRBC BLD-RTO: 0 PER 100 WBC
PLATELET # BLD AUTO: 62 K/UL (ref 150–400)
PLATELET # BLD AUTO: 70 K/UL (ref 150–400)
PLATELET COMMENT: ABNORMAL
RBC # BLD AUTO: 4.72 M/UL (ref 3.8–5.2)
RBC MORPH BLD: ABNORMAL
WBC # BLD AUTO: 5.2 K/UL (ref 3.6–11)

## 2024-10-03 PROCEDURE — G8484 FLU IMMUNIZE NO ADMIN: HCPCS | Performed by: INTERNAL MEDICINE

## 2024-10-03 PROCEDURE — 3078F DIAST BP <80 MM HG: CPT | Performed by: INTERNAL MEDICINE

## 2024-10-03 PROCEDURE — 36415 COLL VENOUS BLD VENIPUNCTURE: CPT

## 2024-10-03 PROCEDURE — 99213 OFFICE O/P EST LOW 20 MIN: CPT | Performed by: INTERNAL MEDICINE

## 2024-10-03 PROCEDURE — G8399 PT W/DXA RESULTS DOCUMENT: HCPCS | Performed by: INTERNAL MEDICINE

## 2024-10-03 PROCEDURE — 3074F SYST BP LT 130 MM HG: CPT | Performed by: INTERNAL MEDICINE

## 2024-10-03 PROCEDURE — 85049 AUTOMATED PLATELET COUNT: CPT

## 2024-10-03 PROCEDURE — 85025 COMPLETE CBC W/AUTO DIFF WBC: CPT

## 2024-10-03 PROCEDURE — 1123F ACP DISCUSS/DSCN MKR DOCD: CPT | Performed by: INTERNAL MEDICINE

## 2024-10-03 PROCEDURE — G8428 CUR MEDS NOT DOCUMENT: HCPCS | Performed by: INTERNAL MEDICINE

## 2024-10-03 PROCEDURE — 1090F PRES/ABSN URINE INCON ASSESS: CPT | Performed by: INTERNAL MEDICINE

## 2024-10-03 PROCEDURE — G8417 CALC BMI ABV UP PARAM F/U: HCPCS | Performed by: INTERNAL MEDICINE

## 2024-10-03 PROCEDURE — 1036F TOBACCO NON-USER: CPT | Performed by: INTERNAL MEDICINE

## 2024-10-03 NOTE — PROGRESS NOTES
[facility] Lab Draw Note:  Arrived - 0930    Labs drawn peripherally from L arm - CBC, Citrated tube    0935 - Tolerated well.  Pt denies any acute problems/changes. Discharged from [facility] ambulatory. No distress. Next appt: NONE       See EPIC for pending lab results.

## 2024-10-03 NOTE — PROGRESS NOTES
[facility] [place]  at [facility]  [address] [facility] [address] [address], [facility] [address]  [place] [id number] [phone]         Hematology Oncology Note        Patient: Henny Darden MRN: [id number]  SSN: [id number]    YOB: 1945  Age: 79 y.o.  Sex: female        Subjective:      Henny Darden is a 79 y.o. female who I am seeing for severe thrombocytopenia. She has occasional nose bleeds. She is excessively fatigued. Nail colors have changed. Bone marrow is normal. She comes in to discuss the next steps with a lab draw today.       Review of Systems:    Constitutional: negative  Eyes: negative  Ears, Nose, Mouth, Throat, and Face: negative  Respiratory: negative  Cardiovascular: negative  Gastrointestinal: negative  Genitourinary:negative  Integument/Breast: negative  Hematologic/Lymphatic: negative  Musculoskeletal:negative  Neurological: negative      Past Medical History:   Diagnosis Date    Arthritis     GERD (gastroesophageal reflux disease)     Hypertension     PUD (peptic ulcer disease)     Thyroid disease      Past Surgical History:   Procedure Laterality Date    COLONOSCOPY FLX DX W/COLLJ SPEC WHEN PFRMD  1/11/2013         OTHER SURGICAL HISTORY      lens implant bilat.    TONSILLECTOMY        Family History   Problem Relation Age of Onset    Diabetes Sister     Cancer Sister         leg     Social History     Tobacco Use    Smoking status: Never    Smokeless tobacco: Never   Substance Use Topics    Alcohol use: No      Prior to Admission medications    Medication Sig Start Date End Date Taking? Authorizing Provider   Omega-3 Fatty Acids (FISH OIL) 1000 MG capsule Take by mouth daily   Yes Anupama Davis MD   acetaminophen (TYLENOL) 500 MG tablet Take 1 tablet by mouth every 6 hours as needed for Pain   Yes Anupama Davis MD   Multiple Vitamin (MULTI VITAMIN DAILY PO) Take by mouth   Yes Anupama Davis MD   fluticasone (FLONASE) 50

## 2024-11-01 ENCOUNTER — TRANSCRIBE ORDERS (OUTPATIENT)
Facility: HOSPITAL | Age: 79
End: 2024-11-01

## 2024-11-01 DIAGNOSIS — M85.80 OSTEOPENIA, UNSPECIFIED LOCATION: Primary | ICD-10-CM

## 2024-11-01 DIAGNOSIS — Z12.31 VISIT FOR SCREENING MAMMOGRAM: ICD-10-CM

## 2024-11-01 DIAGNOSIS — Z78.0 POSTMENOPAUSAL STATE: ICD-10-CM

## 2024-12-18 ENCOUNTER — HOSPITAL ENCOUNTER (OUTPATIENT)
Facility: HOSPITAL | Age: 79
Discharge: HOME | End: 2024-12-21
Payer: MEDICARE

## 2024-12-18 DIAGNOSIS — Z78.0 POSTMENOPAUSAL STATE: ICD-10-CM

## 2024-12-18 DIAGNOSIS — M85.80 OSTEOPENIA, UNSPECIFIED LOCATION: ICD-10-CM

## 2024-12-18 PROCEDURE — 77080 DXA BONE DENSITY AXIAL: CPT

## 2025-01-08 ENCOUNTER — TELEPHONE (OUTPATIENT)
Age: 80
End: 2025-01-08

## 2025-01-08 NOTE — TELEPHONE ENCOUNTER
Called pt to reschedule appt tomorrow due to water supply.  Upon reviewing chart, pt is to return as needed.  I do not see latest labs or notes from last visit so pt may very likely not need to be seen.  Left VM asking for a return call.

## 2025-05-28 ENCOUNTER — TRANSCRIBE ORDERS (OUTPATIENT)
Facility: HOSPITAL | Age: 80
End: 2025-05-28

## 2025-05-28 DIAGNOSIS — Z12.31 VISIT FOR SCREENING MAMMOGRAM: Primary | ICD-10-CM

## 2025-06-05 ENCOUNTER — OFFICE VISIT (OUTPATIENT)
Age: 80
End: 2025-06-05
Payer: MEDICARE

## 2025-06-05 VITALS
BODY MASS INDEX: 30.8 KG/M2 | OXYGEN SATURATION: 99 % | HEIGHT: 62 IN | TEMPERATURE: 97.7 F | SYSTOLIC BLOOD PRESSURE: 149 MMHG | HEART RATE: 58 BPM | DIASTOLIC BLOOD PRESSURE: 62 MMHG | WEIGHT: 167.4 LBS

## 2025-06-05 DIAGNOSIS — D69.3 IMMUNE THROMBOCYTOPENIC PURPURA (HCC): Primary | ICD-10-CM

## 2025-06-05 PROCEDURE — 1126F AMNT PAIN NOTED NONE PRSNT: CPT | Performed by: INTERNAL MEDICINE

## 2025-06-05 PROCEDURE — G8399 PT W/DXA RESULTS DOCUMENT: HCPCS | Performed by: INTERNAL MEDICINE

## 2025-06-05 PROCEDURE — 3077F SYST BP >= 140 MM HG: CPT | Performed by: INTERNAL MEDICINE

## 2025-06-05 PROCEDURE — 1036F TOBACCO NON-USER: CPT | Performed by: INTERNAL MEDICINE

## 2025-06-05 PROCEDURE — 3078F DIAST BP <80 MM HG: CPT | Performed by: INTERNAL MEDICINE

## 2025-06-05 PROCEDURE — 1123F ACP DISCUSS/DSCN MKR DOCD: CPT | Performed by: INTERNAL MEDICINE

## 2025-06-05 PROCEDURE — G8417 CALC BMI ABV UP PARAM F/U: HCPCS | Performed by: INTERNAL MEDICINE

## 2025-06-05 PROCEDURE — 1090F PRES/ABSN URINE INCON ASSESS: CPT | Performed by: INTERNAL MEDICINE

## 2025-06-05 PROCEDURE — 99214 OFFICE O/P EST MOD 30 MIN: CPT | Performed by: INTERNAL MEDICINE

## 2025-06-05 PROCEDURE — G8428 CUR MEDS NOT DOCUMENT: HCPCS | Performed by: INTERNAL MEDICINE

## 2025-06-05 RX ORDER — IPRATROPIUM BROMIDE AND ALBUTEROL SULFATE 2.5; .5 MG/3ML; MG/3ML
1 SOLUTION RESPIRATORY (INHALATION) EVERY 4 HOURS
COMMUNITY

## 2025-06-05 RX ORDER — MULTIVIT WITH MINERALS/LUTEIN
1000 TABLET ORAL DAILY
COMMUNITY

## 2025-06-05 RX ORDER — DEXAMETHASONE 4 MG/1
40 TABLET ORAL
Qty: 40 TABLET | Refills: 0 | Status: SHIPPED | OUTPATIENT
Start: 2025-06-05 | End: 2025-06-09

## 2025-06-05 RX ORDER — TIZANIDINE 2 MG/1
2 TABLET ORAL EVERY 6 HOURS PRN
COMMUNITY

## 2025-06-05 RX ORDER — LOSARTAN POTASSIUM 25 MG/1
TABLET ORAL
COMMUNITY
Start: 2025-05-27

## 2025-06-05 RX ORDER — LORATADINE 10 MG/1
10 CAPSULE, LIQUID FILLED ORAL DAILY
COMMUNITY

## 2025-06-05 NOTE — PROGRESS NOTES
Henny Darden is a 77 y.o. female here for follow up appt for thrombocytopenia.  Labs done 5/21/25  Pt doing well.No concerns brought up.   Medications reviewed.     1. Have you been to the ER, urgent care clinic since your last visit?  Hospitalized since your last visit? no    2. Have you seen or consulted any other health care providers outside of the [facility] since your last visit?  Include any pap smears or colon screening.  PCP    
MD   losartan (COZAAR) 25 MG tablet  5/27/25  Yes Anupama Davis MD   tiZANidine (ZANAFLEX) 2 MG tablet Take 1 tablet by mouth every 6 hours as needed   Yes Anupama Davis MD   Ascorbic Acid (RADHA-C PO) Take 1,000 mg by mouth   Yes Anupama Davis MD   acetaminophen (TYLENOL) 500 MG tablet Take 1 tablet by mouth every 6 hours as needed for Pain   Yes Anupama Davis MD   fluticasone (FLONASE) 50 MCG/ACT nasal spray 1 spray by Each Nostril route daily   Yes Anupama Davis MD   albuterol sulfate HFA (PROVENTIL;VENTOLIN;PROAIR) 108 (90 Base) MCG/ACT inhaler  4/2/24  Yes Anupama Davis MD   fluticasone-salmeterol (ADVAIR) 100-50 MCG/ACT AEPB diskus inhaler  1/2/24  Yes Anupama Davis MD   hydroCHLOROthiazide (HYDRODIURIL) 25 MG tablet Take by mouth daily   Yes Automatic Reconciliation, Ar   levothyroxine (SYNTHROID) 125 MCG tablet Take by mouth every morning (before breakfast)   Yes Automatic Reconciliation, Ar   potassium chloride (MICRO-K) 10 MEQ extended release capsule Take by mouth 2 times daily   Yes Automatic Reconciliation, Ar   rosuvastatin (CRESTOR) 5 MG tablet ceived the following from [facility]: Outside name: rosuvastatin (CRESTOR) 5 mg tablet 8/16/22  Yes Automatic Reconciliation, Ar   vitamin E 1000 units capsule Take 1 capsule by mouth daily  Patient not taking: Reported on 6/5/2025    Anupama Davis MD   Omega-3 Fatty Acids (FISH OIL) 1000 MG capsule Take by mouth daily  Patient not taking: Reported on 6/5/2025    Anupama Davis MD   Multiple Vitamin (MULTI VITAMIN DAILY PO) Take by mouth  Patient not taking: Reported on 6/5/2025    Anupama Davis MD   Alendronate Sodium 70 MG TBEF Take 1 tablet by mouth every 7 days  Patient not taking: Reported on 6/5/2025    Automatic Reconciliation, Ar   Calcium Carbonate-Vitamin D 600-3.125 MG-MCG TABS Take 1 tablet by mouth daily as needed  Patient not taking: Reported on 6/5/2025

## 2025-07-08 ENCOUNTER — HOSPITAL ENCOUNTER (OUTPATIENT)
Facility: HOSPITAL | Age: 80
Discharge: HOME | End: 2025-07-11
Payer: MEDICARE

## 2025-07-08 DIAGNOSIS — Z12.31 VISIT FOR SCREENING MAMMOGRAM: ICD-10-CM

## 2025-07-08 PROCEDURE — 77067 SCR MAMMO BI INCL CAD: CPT

## 2025-07-30 ENCOUNTER — TELEPHONE (OUTPATIENT)
Age: 80
End: 2025-07-30